# Patient Record
Sex: FEMALE | URBAN - METROPOLITAN AREA
[De-identification: names, ages, dates, MRNs, and addresses within clinical notes are randomized per-mention and may not be internally consistent; named-entity substitution may affect disease eponyms.]

---

## 2022-08-25 ENCOUNTER — APPOINTMENT (RX ONLY)
Dept: URBAN - METROPOLITAN AREA CLINIC 60 | Facility: CLINIC | Age: 27
Setting detail: DERMATOLOGY
End: 2022-08-25

## 2022-08-25 DIAGNOSIS — B65.3 CERCARIAL DERMATITIS: ICD-10-CM

## 2022-08-25 PROBLEM — L30.9 DERMATITIS, UNSPECIFIED: Status: ACTIVE | Noted: 2022-08-25

## 2022-08-25 PROCEDURE — ? PRESCRIPTION MEDICATION MANAGEMENT

## 2022-08-25 PROCEDURE — ? COUNSELING

## 2022-08-25 PROCEDURE — 99203 OFFICE O/P NEW LOW 30 MIN: CPT

## 2022-08-25 PROCEDURE — ? PRESCRIPTION

## 2022-08-25 RX ORDER — TRIAMCINOLONE ACETONIDE 1 MG/G
CREAM TOPICAL BID
Qty: 30 | Refills: 5 | Status: ERX | COMMUNITY
Start: 2022-08-25

## 2022-08-25 RX ADMIN — TRIAMCINOLONE ACETONIDE: 1 CREAM TOPICAL at 00:00

## 2022-08-25 ASSESSMENT — LOCATION SIMPLE DESCRIPTION DERM
LOCATION SIMPLE: LEFT BREAST
LOCATION SIMPLE: RIGHT BREAST

## 2022-08-25 ASSESSMENT — LOCATION DETAILED DESCRIPTION DERM
LOCATION DETAILED: RIGHT PERIAREOLAR BREAST 8-9:00 REGION
LOCATION DETAILED: LEFT LATERAL BREAST 4-5:00 REGION

## 2022-08-25 ASSESSMENT — LOCATION ZONE DERM: LOCATION ZONE: TRUNK

## 2025-04-18 NOTE — HPI: RASH
OFFICE NOTE       The following individual(s) verbally consented to be recorded using ambient AI listening technology and understand that they can each withdraw their consent to this listening technology at any point by asking the clinician to turn off or pause the recording:    Patient name: Radha Vega  Additional names:            Patient ID: Radha Vega is a 39 year old female.  Today's Date: 04/18/25  Chief Complaint: Urgent Care F/u (Stye L lower lid of eye, pain, redness)      History of Present Illness  Radha Vega is a 39 year old female who presents with a sty on her left lower eyelid.    She has a sty on her left lower eyelid that is not improving despite treatment. Initially, she sought care at an urgent care facility where she was prescribed an ointment. 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    She has been attempting to use warm compresses at home, but finds it challenging to do so at work due to the logistics of her office environment. 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       Vitals:    04/18/25 1210   BP: 106/73   Pulse: 62   Weight: 150 lb (68 kg)   Height: 5' 1\" (1.549 m)     body mass index is 28.34 kg/m².  BP Readings from Last 3 Encounters:   04/18/25 106/73   04/14/25 112/59   08/31/24 108/71     The ASCVD Risk score (Brenda DK, et al., 2019) failed to calculate for the following reasons:    The 2019 ASCVD risk score is only valid for ages 40 to 79  Results         Medications reviewed:  Current Medications[1]      Assessment & Plan    There are no diagnoses linked to this encounter.  Assessment & Plan  Hordeolum (sty)  Sty on left lower eyelid unresponsive to ointment, causing discomfort and blurred vision. Oral erythromycin chosen for systemic 
What Type Of Note Output Would You Prefer (Optional)?: Standard Output
Is The Patient Presenting As Previously Scheduled?: No, they are a work-in
treatment. Neomycin, polymyxin, dexamethasone eye drops prescribed for local relief and prevention.  - Prescribe oral erythromycin for 5 days: 2 pills on the first day, then 1 pill daily for the next 4 days.  - Prescribe neomycin, polymyxin, dexamethasone eye drops: 1 drop in each eye up to 4 times a day for 7 days.  - Advise continuation of warm compresses at home.    General Health Maintenance  Due for a physical examination after June 10th.  - Schedule a physical examination after June 10th.       Follow Up: As needed/if symptoms worsen or No follow-ups on file..         Objective/ Results:   Physical Exam  Eyes:      General:         Left eye: Hordeolum present.       Physical Exam       Reviewed:    Patient Active Problem List    Diagnosis    Acute bilateral thoracic back pain    Lumbar back pain    COVID-19    Allergic rhinitis    Vegetarian diet      Allergies[2]   Short Social Hx on File[3]   Review of Systems   Constitutional: Negative.    Respiratory: Negative.     Cardiovascular: Negative.    Gastrointestinal: Negative.    Skin: Negative.    Neurological: Negative.        All other systems negative unless otherwise stated in ROS or HPI above.       Shmuel Hicks MD  Internal Medicine       Call office with any questions or seek emergency care if necessary.   Patient understands and agrees to follow directions and advice.      ----------------------------------------- PATIENT INSTRUCTIONS-----------------------------------------     There are no Patient Instructions on file for this visit.        The 21st Century Cures Act makes medical notes available to patients in the interest of transparency.  However, please be advised that this is a medical document.  It is intended as a peer to peer communication.  It is written in medical language and may contain abbreviations or verbiage that are technical and unfamiliar.  It may appear blunt or direct.  Medical documents are intended to carry relevant information, 
How Severe Is Your Rash?: moderate
facts as evident, and the clinical opinion of the practitioner.          [1]   Current Outpatient Medications   Medication Sig Dispense Refill    erythromycin 5 MG/GM Ophthalmic Ointment Place 1 Application into both eyes every 6 (six) hours for 7 days. 3.5 g 0   [2] No Known Allergies  [3]   Social History  Socioeconomic History    Marital status:    Tobacco Use    Smoking status: Never     Passive exposure: Never    Smokeless tobacco: Never   Vaping Use    Vaping status: Never Used   Substance and Sexual Activity    Alcohol use: Yes     Alcohol/week: 0.0 standard drinks of alcohol     Comment: Beer and wine socially    Drug use: No    Sexual activity: Yes     Partners: Male     Birth control/protection: Condom   Other Topics Concern    Caffeine Concern Yes     Comment: 1-2 cups of coffee daily    Reaction to local anesthetic No    Pt has a pacemaker No    Pt has a defibrillator No     
Is This A New Presentation, Or A Follow-Up?: Rash
Additional History: Patient stated she has a bite on her right breasts.